# Patient Record
Sex: MALE | Race: WHITE | Employment: STUDENT | URBAN - METROPOLITAN AREA
[De-identification: names, ages, dates, MRNs, and addresses within clinical notes are randomized per-mention and may not be internally consistent; named-entity substitution may affect disease eponyms.]

---

## 2018-01-14 ENCOUNTER — HOSPITAL ENCOUNTER (EMERGENCY)
Age: 23
Discharge: HOME | End: 2018-01-14
Attending: EMERGENCY MEDICINE
Payer: COMMERCIAL

## 2018-01-14 ENCOUNTER — APPOINTMENT (OUTPATIENT)
Dept: GENERAL RADIOLOGY | Age: 23
End: 2018-01-14
Attending: EMERGENCY MEDICINE
Payer: COMMERCIAL

## 2018-01-14 VITALS
OXYGEN SATURATION: 97 % | RESPIRATION RATE: 18 BRPM | DIASTOLIC BLOOD PRESSURE: 84 MMHG | HEIGHT: 67 IN | SYSTOLIC BLOOD PRESSURE: 134 MMHG | TEMPERATURE: 98 F | WEIGHT: 230 LBS | BODY MASS INDEX: 36.1 KG/M2 | HEART RATE: 82 BPM

## 2018-01-14 DIAGNOSIS — S93.491A SPRAIN OF ANTERIOR TALOFIBULAR LIGAMENT OF RIGHT ANKLE, INITIAL ENCOUNTER: Primary | ICD-10-CM

## 2018-01-14 PROCEDURE — 74011250637 HC RX REV CODE- 250/637: Performed by: EMERGENCY MEDICINE

## 2018-01-14 PROCEDURE — 99284 EMERGENCY DEPT VISIT MOD MDM: CPT | Performed by: EMERGENCY MEDICINE

## 2018-01-14 PROCEDURE — 75810000053 HC SPLINT APPLICATION: Performed by: EMERGENCY MEDICINE

## 2018-01-14 PROCEDURE — 73610 X-RAY EXAM OF ANKLE: CPT

## 2018-01-14 PROCEDURE — L4350 ANKLE CONTROL ORTHO PRE OTS: HCPCS

## 2018-01-14 RX ORDER — BUDESONIDE 9 MG/1
TABLET, FILM COATED, EXTENDED RELEASE ORAL
COMMUNITY

## 2018-01-14 RX ORDER — IBUPROFEN 800 MG/1
800 TABLET ORAL
Status: COMPLETED | OUTPATIENT
Start: 2018-01-14 | End: 2018-01-14

## 2018-01-14 RX ORDER — ALBUTEROL SULFATE 0.83 MG/ML
SOLUTION RESPIRATORY (INHALATION) ONCE
COMMUNITY

## 2018-01-14 RX ORDER — CHOLECALCIFEROL (VITAMIN D3) 50 MCG
CAPSULE ORAL
COMMUNITY

## 2018-01-14 RX ADMIN — IBUPROFEN 800 MG: 800 TABLET ORAL at 02:01

## 2018-01-14 NOTE — DISCHARGE INSTRUCTIONS
Ankle Sprain: Care Instructions  Your Care Instructions    An ankle sprain can happen when you twist your ankle. The ligaments that support the ankle can get stretched and torn. Often the ankle is swollen and painful. Ankle sprains may take from several weeks to several months to heal. Usually, the more pain and swelling you have, the more severe your ankle sprain is and the longer it will take to heal. You can heal faster and regain strength in your ankle with good home treatment. It is very important to give your ankle time to heal completely, so that you do not easily hurt your ankle again. Follow-up care is a key part of your treatment and safety. Be sure to make and go to all appointments, and call your doctor if you are having problems. It's also a good idea to know your test results and keep a list of the medicines you take. How can you care for yourself at home? · Prop up your foot on pillows as much as possible for the next 3 days. Try to keep your ankle above the level of your heart. This will help reduce the swelling. · Follow your doctor's directions for wearing a splint or elastic bandage. Wrapping the ankle may help reduce or prevent swelling. · Your doctor may give you a splint, a brace, an air stirrup, or another form of ankle support to protect your ankle until it is healed. Wear it as directed while your ankle is healing. Do not remove it unless your doctor tells you to. After your ankle has healed, ask your doctor whether you should wear the brace when you exercise. · Put ice or cold packs on your injured ankle for 10 to 20 minutes at a time. Try to do this every 1 to 2 hours for the next 3 days (when you are awake) or until the swelling goes down. Put a thin cloth between the ice and your skin. · You may need to use crutches until you can walk without pain. If you do use crutches, try to bear some weight on your injured ankle if you can do so without pain.  This helps the ankle heal.  · Take pain medicines exactly as directed. ¨ If the doctor gave you a prescription medicine for pain, take it as prescribed. ¨ If you are not taking a prescription pain medicine, ask your doctor if you can take an over-the-counter medicine. · If you have been given ankle exercises to do at home, do them exactly as instructed. These can promote healing and help prevent lasting weakness. When should you call for help? Call your doctor now or seek immediate medical care if:  ? · Your pain is getting worse. ? · Your swelling is getting worse. ? · Your splint feels too tight or you are unable to loosen it. ? Watch closely for changes in your health, and be sure to contact your doctor if:  ? · You are not getting better after 1 week. Where can you learn more? Go to http://angie-cathy.info/. Enter T388 in the search box to learn more about \"Ankle Sprain: Care Instructions. \"  Current as of: March 21, 2017  Content Version: 11.4  © 9585-4545 [employer]. Care instructions adapted under license by [employer] (which disclaims liability or warranty for this information). If you have questions about a medical condition or this instruction, always ask your healthcare professional. [address] any warranty or liability for your use of this information.

## 2018-01-14 NOTE — ED PROVIDER NOTES
HPI Comments: Patient is a 55-year-old male he reports he was walking earlier today and stepped in a pothole injuring his right ankle. States he fell but has minor scrapes. Did not hit his head. Reports severe anterior ankle pain. States she was ambulatory earlier but now that his ankle is swollen he finds it hard to walk. Patient is a 25 y.o. male presenting with ankle problem. The history is provided by the patient. No  was used. Ankle Injury    Pertinent negatives include no back pain. History reviewed. No pertinent past medical history. Past Surgical History:   Procedure Laterality Date    HX HEENT           History reviewed. No pertinent family history. Social History     Social History    Marital status: SINGLE     Spouse name: N/A    Number of children: N/A    Years of education: N/A     Occupational History    Not on file. Social History Main Topics    Smoking status: Not on file    Smokeless tobacco: Not on file    Alcohol use Not on file    Drug use: Not on file    Sexual activity: Not on file     Other Topics Concern    Not on file     Social History Narrative    No narrative on file         ALLERGIES: Review of patient's allergies indicates no known allergies. Review of Systems   Constitutional: Negative for fatigue and fever. HENT: Negative for sore throat. Respiratory: Negative for cough, chest tightness and shortness of breath. Cardiovascular: Negative for leg swelling. Gastrointestinal: Negative for abdominal pain. Genitourinary: Negative for dysuria. Musculoskeletal: Negative for back pain. Right ankle pain   Neurological: Negative for syncope and headaches. Vitals:    01/14/18 0031   BP: 137/89   Pulse: 94   Resp: 20   Temp: 97.8 °F (36.6 °C)   SpO2: 98%   Weight: 104.3 kg (230 lb)   Height: 5' 7\" (1.702 m)            Physical Exam   Constitutional: He is oriented to person, place, and time.  He appears well-developed and well-nourished. No distress. HENT:   Head: Normocephalic and atraumatic. Eyes: Conjunctivae and EOM are normal. Pupils are equal, round, and reactive to light. Neck: Normal range of motion. Neck supple. Cardiovascular: Normal rate, regular rhythm and normal heart sounds. Pulmonary/Chest: Effort normal and breath sounds normal. No respiratory distress. He has no wheezes. He has no rales. Abdominal: Soft. He exhibits no distension. There is no tenderness. There is no rebound. Musculoskeletal: Normal range of motion. He exhibits tenderness. He exhibits no edema. The right ankle looks somewhat swollen around the medial as well as lateral malleoli. Tenderness the ATFL region. Normal pulses. No bony tenderness. Neurological: He is alert and oriented to person, place, and time. Skin: Skin is warm and dry. No rash noted. He is not diaphoretic. Psychiatric: He has a normal mood and affect. His behavior is normal.   Vitals reviewed. MDM  Number of Diagnoses or Management Options  Sprain of anterior talofibular ligament of right ankle, initial encounter: new and does not require workup  Diagnosis management comments: Discussed RICE therapy for treatment of sprains. Patient splinted and discharged with crutches. patient given ibuprofen here in the ED. Discharged home in stable condition. Return precautions discussed. Amount and/or Complexity of Data Reviewed  Tests in the radiology section of CPT®: ordered and reviewed (Xr Ankle Rt Min 3 V    Result Date: 1/14/2018  EXAM:  XR ANKLE RT MIN 3 V INDICATION:  injury COMPARISON: None. FINDINGS: Three views of the right ankle demonstrate no fracture or disruption of the ankle mortise. Soft tissue swelling.       IMPRESSION: Soft tissue swelling without evidence of fracture or dislocation.     )  Review and summarize past medical records: yes  Independent visualization of images, tracings, or specimens: yes    Risk of Complications, Morbidity, and/or Mortality  Presenting problems: low  Diagnostic procedures: low  Management options: low    Patient Progress  Patient progress: improved    ED Course       Procedures

## 2018-01-14 NOTE — ED NOTES
I have reviewed discharge instructions with the patient. The patient verbalized understanding. Patient left ED via Discharge Method: ambulatory to Home with (friend). Opportunity for questions and clarification provided. Patient given 0 scripts. To continue your aftercare when you leave the hospital, you may receive an automated call from our care team to check in on how you are doing. This is a free service and part of our promise to provide the best care and service to meet your aftercare needs.  If you have questions, or wish to unsubscribe from this service please call [phone]. Thank you for Choosing our [facility] Emergency Department.

## 2018-04-03 ENCOUNTER — IMPORTED ENCOUNTER (OUTPATIENT)
Dept: URBAN - METROPOLITAN AREA CLINIC 38 | Facility: CLINIC | Age: 23
End: 2018-04-03

## 2018-04-03 PROBLEM — H00.12 CHALAZION RIGHT LOWER EYELID: Noted: 2018-04-03

## 2018-04-03 PROCEDURE — 92002 INTRM OPH EXAM NEW PATIENT: CPT

## 2018-04-06 ENCOUNTER — IMPORTED ENCOUNTER (OUTPATIENT)
Dept: URBAN - METROPOLITAN AREA CLINIC 38 | Facility: CLINIC | Age: 23
End: 2018-04-06

## 2018-04-06 PROBLEM — H00.12 CHALAZION RIGHT LOWER EYELID: Noted: 2018-04-06

## 2018-04-06 PROCEDURE — 67800 REMOVE EYELID LESION: CPT

## 2022-07-02 ASSESSMENT — VISUAL ACUITY
OS_SC: 20/20-1
OD_SC: 20/20-1
OS_SC: 20/20-1
OD_SC: 20/20-2